# Patient Record
Sex: FEMALE | Race: WHITE | Employment: UNEMPLOYED | URBAN - METROPOLITAN AREA
[De-identification: names, ages, dates, MRNs, and addresses within clinical notes are randomized per-mention and may not be internally consistent; named-entity substitution may affect disease eponyms.]

---

## 2021-03-28 ENCOUNTER — HOSPITAL ENCOUNTER (EMERGENCY)
Facility: CLINIC | Age: 51
Discharge: HOME | End: 2021-03-28
Attending: EMERGENCY MEDICINE | Admitting: EMERGENCY MEDICINE
Payer: COMMERCIAL

## 2021-03-28 VITALS
OXYGEN SATURATION: 98 % | TEMPERATURE: 98 F | HEART RATE: 78 BPM | RESPIRATION RATE: 18 BRPM | SYSTOLIC BLOOD PRESSURE: 138 MMHG | DIASTOLIC BLOOD PRESSURE: 92 MMHG

## 2021-03-28 DIAGNOSIS — H61.21 IMPACTED CERUMEN OF RIGHT EAR: ICD-10-CM

## 2021-03-28 PROCEDURE — 69210 REMOVE IMPACTED EAR WAX UNI: CPT | Mod: RT

## 2021-03-28 PROCEDURE — 99283 EMERGENCY DEPT VISIT LOW MDM: CPT | Mod: 25

## 2021-03-28 RX ORDER — ACETAMINOPHEN 160 MG
100 TABLET,DISINTEGRATING ORAL ONCE
Status: DISCONTINUED | OUTPATIENT
Start: 2021-03-28 | End: 2021-03-28 | Stop reason: HOSPADM

## 2021-03-28 NOTE — ED PROVIDER NOTES
History   Chief Complaint:  Otalgia       HPI   Lluvia Calix is a 51 year old female who presents with otalgia. The patient states that this morning she was using a q tip on her right ear. She states that she was able to get a lot of wax out but after could not hear well out of this ear. The patient denies any drainage or bleeding from the ear but states that it is very painful.     Review of Systems   HENT: Positive for ear pain. Negative for ear discharge.    All other systems reviewed and are negative.    Allergies:  The patient has no known allergies.     Medications:  Wellbutrin   Prozac   Dilantin   Topamax   Synthroid     Past Medical History:    Anxiety  Depression   Seizures   hypothyroidism   SVT    Past Surgical History:    Shoulder surgery     Social History:  Presents alone     Physical Exam     Patient Vitals for the past 24 hrs:   BP Temp Temp src Pulse Resp SpO2   03/28/21 1721 (!) 138/92 98  F (36.7  C) Temporal 78 18 98 %     Physical Exam  HEMATOLOGIC/IMMUNOLOGIC/LYMPHATIC:  No right preauricular adenopathy.  HENT: Cerumen impacted in right ear canal.  Once some of this was cleared the visible portion of the tympanic membrane appeared noninflamed and without injury.  EYES:  Conjunctivae normal.  NEUROLOGIC:  Alert, conversant.  PSYCHIATRIC:  Normal mood.    Emergency Department Course     Procedures    Emergency Department Course:    Reviewed:  I reviewed nursing notes, vitals, past medical history and care everywhere    Assessments:  1736 I obtained history and examined the patient as noted above.     1825 I returned to check on patient and answered questions prior to discharge.     Disposition:  The patient was discharged to home.     Impression & Plan     Medical Decision Making:  This patient presents with impacted cerumen of the right ear canal. We tried to irrigate this out which was ineffective but soften some. I was able to extract a small amount with an ear spud. She could hear then a  bit better. Still muffled. Advised to try home remedies  from the pharmacy OTC and otherwise ENT follow up if this still does not clear.     Covid-19  Lluvia Calix was evaluated during a global COVID-19 pandemic, which necessitated consideration that the patient might be at risk for infection with the SARS-CoV-2 virus that causes COVID-19.   Applicable protocols for evaluation were followed during the patient's care.     Diagnosis:    ICD-10-CM    1. Impacted cerumen of right ear  H61.21        Discharge Medications:  New Prescriptions    No medications on file       Scribe Disclosure:  Sophie PEREZ, am serving as a scribe at 5:36 PM on 3/28/2021 to document services personally performed by Joel Calix MD based on my observations and the provider's statements to me.        Joel Calix MD  03/28/21 2101